# Patient Record
Sex: FEMALE
[De-identification: names, ages, dates, MRNs, and addresses within clinical notes are randomized per-mention and may not be internally consistent; named-entity substitution may affect disease eponyms.]

---

## 2019-09-20 ENCOUNTER — APPOINTMENT (OUTPATIENT)
Dept: OTOLARYNGOLOGY | Facility: CLINIC | Age: 49
End: 2019-09-20
Payer: COMMERCIAL

## 2019-09-20 VITALS
HEART RATE: 64 BPM | SYSTOLIC BLOOD PRESSURE: 118 MMHG | HEIGHT: 67 IN | WEIGHT: 120 LBS | BODY MASS INDEX: 18.83 KG/M2 | DIASTOLIC BLOOD PRESSURE: 59 MMHG

## 2019-09-20 DIAGNOSIS — H93.293 OTHER ABNORMAL AUDITORY PERCEPTIONS, BILATERAL: ICD-10-CM

## 2019-09-20 DIAGNOSIS — Z78.9 OTHER SPECIFIED HEALTH STATUS: ICD-10-CM

## 2019-09-20 DIAGNOSIS — H69.82 OTHER SPECIFIED DISORDERS OF EUSTACHIAN TUBE, LEFT EAR: ICD-10-CM

## 2019-09-20 PROBLEM — Z00.00 ENCOUNTER FOR PREVENTIVE HEALTH EXAMINATION: Status: ACTIVE | Noted: 2019-09-20

## 2019-09-20 PROCEDURE — 99203 OFFICE O/P NEW LOW 30 MIN: CPT

## 2019-09-20 PROCEDURE — 92567 TYMPANOMETRY: CPT

## 2019-09-20 PROCEDURE — 92557 COMPREHENSIVE HEARING TEST: CPT

## 2019-09-20 NOTE — ASSESSMENT
[FreeTextEntry1] : Her ear exam and audiogram were normal. We discussed the possibility of eustachian tube dysfunction\par \par \par PLAN\par \par -findings and management options discussed in detail with the patient. \par -good aural hygiene\par -avoid using cotton swabs in the ears\par -wax removal drops as needed. \par -noise precautions\par -annual audiogram\par -She may try nasal steroid spray and/or antihistamines her decongestant\par -Consider further workup if her symptoms persist. I spoke with her about performing nasal endoscopy.\par -follow up in one month if she has persistent symptoms. We will then proceed with further workup\par -call and return earlier if any concerns. \par \par

## 2019-09-20 NOTE — HISTORY OF PRESENT ILLNESS
[de-identified] : BARRY ARMSTRONG is a 49 year patient Here for abnormal auditory perception and possible hearing loss. She feels like she is having more difficulty hearing. She occasionally has eustachian tube dysfunction on the left side. She denies otalgia, otorrhea, tinnitus, or dizziness. She does not have nasal or sinus symptoms. She has not been sick for flying recently. She does not smoke\par \par \par History of recurrent ear infections-no\par Prior ear surgery-no\par History of otologic trauma-no\par Noise exposure-no\par Family history of hearing loss-no\par Prior audiogram-no\par

## 2025-06-26 ENCOUNTER — APPOINTMENT (OUTPATIENT)
Dept: OTOLARYNGOLOGY | Facility: CLINIC | Age: 55
End: 2025-06-26
Payer: COMMERCIAL

## 2025-06-26 PROCEDURE — 99204 OFFICE O/P NEW MOD 45 MIN: CPT

## 2025-07-08 ENCOUNTER — APPOINTMENT (OUTPATIENT)
Dept: OTOLARYNGOLOGY | Facility: CLINIC | Age: 55
End: 2025-07-08